# Patient Record
Sex: FEMALE | Race: OTHER | NOT HISPANIC OR LATINO | URBAN - METROPOLITAN AREA
[De-identification: names, ages, dates, MRNs, and addresses within clinical notes are randomized per-mention and may not be internally consistent; named-entity substitution may affect disease eponyms.]

---

## 2019-12-18 VITALS
OXYGEN SATURATION: 100 % | DIASTOLIC BLOOD PRESSURE: 75 MMHG | SYSTOLIC BLOOD PRESSURE: 149 MMHG | HEIGHT: 62 IN | HEART RATE: 75 BPM | TEMPERATURE: 98 F | WEIGHT: 141.1 LBS | RESPIRATION RATE: 16 BRPM

## 2019-12-18 RX ORDER — CHLORHEXIDINE GLUCONATE 213 G/1000ML
1 SOLUTION TOPICAL ONCE
Refills: 0 | Status: DISCONTINUED | OUTPATIENT
Start: 2019-12-19 | End: 2019-12-19

## 2019-12-18 NOTE — H&P ADULT - HISTORY OF PRESENT ILLNESS
38yo F with PMHx of HLD, HTN, FHx of MI (father in 70’s) who presented to her cardiologist Dr. Lim reporting occasional, brief, non-radiating left sided chest discomfort with exertion as well as at rest for the past several months. Pt also reported SPENCER upon walking 3 blocks or walking up 2 flights of stairs which is relieved with rest. Pt also reports fatigue, PND and orthopnea (states she sleeps sitting up). She also endorses occasional dizziness and palpitations. Pt states she has mild bilateral leg swelling and leg pain when standing for prolonged periods. Pt underwent a stress ECHO on 12/11/19 which was positive for myocardial ischemia by EKG revealing ST depression in leads II, III, aVF, V4-V6 at peak exercise, along with symptoms of chest tightness during recovery, normal LV wall motion response to exercise. TTE on 12/4/19 revealed EF 60-65%, mild diastolic dysfunction, borderline left atrial enlargement, trace MR and TR, trace pulmonary insufficiency, right ventricular systolic pressure 26mmHg. Pt denies syncope, melena, bright red blood per rectum, N/V.   In light of patients risk factors, CCS class IV symptoms  and abnormal stress ECHO patient is referred to [facility] for cardiac catheterization with possible intervention.   LMP???? 40yo Polish speaking F with PMHx of HLD, HTN, FHx of MI (father in 70’s) who presented to her cardiologist Dr. Lim reporting occasional, brief, non-radiating left sided chest discomfort with exertion as well as at rest for the past several months. Pt also reported SPENCER upon walking 3 blocks or walking up 2 flights of stairs which is relieved with rest. Pt also reports fatigue, PND and orthopnea (states she sleeps sitting up). She also endorses occasional dizziness and palpitations. Pt states she has mild bilateral leg swelling and leg pain when standing for prolonged periods. Pt underwent a stress ECHO on 12/11/19 which was positive for myocardial ischemia by EKG revealing ST depression in leads II, III, aVF, V4-V6 at peak exercise, along with symptoms of chest tightness during recovery, normal LV wall motion response to exercise. TTE on 12/4/19 revealed EF 60-65%, mild diastolic dysfunction, borderline left atrial enlargement, trace MR and TR, trace pulmonary insufficiency, right ventricular systolic pressure 26mmHg. Pt denies syncope, melena, bright red blood per rectum, N/V.   In light of patients risk factors, CCS class IV symptoms  and abnormal stress ECHO patient is referred to [facility] for cardiac catheterization with possible intervention.   LMP: 12/4/19

## 2019-12-18 NOTE — H&P ADULT - ASSESSMENT
38yo Sami speaking F with PMHx of HLD, HTN, FHx of MI (father in 70’s) who in light of risk factors, CCS class IV anginal symptoms and abnormal stress ECHO patient presents today for cardiac catheterization.     ASA II, Mallampati II    Hgb/HCT: 13.9/42.6. Pt denies bleeding, melena, bright red blood per rectum, hematuria. Pt reports compliance with daily Plavix 75mg PO x 1 for the past week. Pt has allergy to ASA, had facial swelling after one dose. Dr. Lim is aware and is recommending diagnostic cath today and ASA desensitization after cath. Pt was loaded with Plavix 150mg PO x 1 as per Dr. Lim.  NS@ 75ml/hr was given prior to procedure. EF 60-65% per TTE. Pt euvolemic on exam.     Pt is a candidate for moderate sedation: Yes    Risks & benefits of procedure and alternative therapy have been explained to the patient including but not limited to: allergic reaction, bleeding w/possible need for blood transfusion, infection, renal and vascular compromise, limb damage, arrhythmia, stroke, vessel dissection/perforation, Myocardial infarction, emergent CABG. Informed consent obtained and in chart. 38yo Pashto speaking F with PMHx of HLD, HTN, FHx of MI (father in 70’s) who in light of risk factors, CCS class IV anginal symptoms and abnormal stress ECHO patient presents today for cardiac catheterization.     ASA II, Mallampati II    Hgb/HCT: 13.9/42.6. Pt denies bleeding, melena, bright red blood per rectum, hematuria. Pt reports compliance with daily Plavix 75mg PO x 1 for the past week. Pt has allergy to ASA, had facial swelling after one dose. Dr. Lim is aware and is recommending diagnostic cath today and ASA desensitization after cath. Pt was loaded with Plavix 150mg PO x 1 as per Dr. Lim.  NS@ 75ml/hr was given prior to procedure. EF 60-65% per TTE. Pt euvolemic on exam.   K 3.6, KCl 40mEq PO x 1 was given prior to cath.     Pt is a candidate for moderate sedation: Yes    Risks & benefits of procedure and alternative therapy have been explained to the patient including but not limited to: allergic reaction, bleeding w/possible need for blood transfusion, infection, renal and vascular compromise, limb damage, arrhythmia, stroke, vessel dissection/perforation, Myocardial infarction, emergent CABG. Informed consent obtained and in chart. 40yo Occitan speaking F with PMHx of HLD, HTN, FHx of MI (father in 70’s) who in light of risk factors, CCS class IV anginal symptoms and abnormal stress ECHO patient presents today for cardiac catheterization.     ASA II, Mallampati II    Hgb/HCT: 13.9/42.6. Pt denies bleeding, melena, bright red blood per rectum, hematuria. Pt reports compliance with daily Plavix 75mg PO x 1 for the past week. Pt has allergy to ASA, had facial swelling after one dose. Dr. Lim is aware and is recommending diagnostic cath today and ASA desensitization after cath. Pt was loaded with Plavix 150mg PO x 1 as per Dr. Lim.  NS@ 75ml/hr was given prior to procedure. EF 60-65% per TTE. Pt euvolemic on exam.   K 3.6, KCl 40mEq PO x 1 was given prior to cath.   , HDL 96. Pt is currently not on statin, consider addition of statin post cath.     Pt is a candidate for moderate sedation: Yes    Risks & benefits of procedure and alternative therapy have been explained to the patient including but not limited to: allergic reaction, bleeding w/possible need for blood transfusion, infection, renal and vascular compromise, limb damage, arrhythmia, stroke, vessel dissection/perforation, Myocardial infarction, emergent CABG. Informed consent obtained and in chart.

## 2019-12-18 NOTE — H&P ADULT - NSHPLABSRESULTS_GEN_ALL_CORE
EKG: NSR@ 71bpm, TWI in III, V2-V4, flat T wave in II, V5-V6                          13.9   6.07  )-----------( 219      ( 19 Dec 2019 11:19 )             42.6       12-19    138  |  100  |  11  ----------------------------<  83  3.6   |  28  |  0.86    Ca    9.2      19 Dec 2019 11:59    TPro  6.7  /  Alb  4.1  /  TBili  0.3  /  DBili  x   /  AST  19  /  ALT  13  /  AlkPhos  46  12-19      PT/INR - ( 19 Dec 2019 11:19 )   PT: 12.2 sec;   INR: 1.08          PTT - ( 19 Dec 2019 11:19 )  PTT:36.8 sec    CARDIAC MARKERS ( 19 Dec 2019 11:59 )  x     / x     / 94 U/L / x     / 1.3 ng/mL

## 2019-12-19 ENCOUNTER — OUTPATIENT (OUTPATIENT)
Dept: OUTPATIENT SERVICES | Facility: HOSPITAL | Age: 39
LOS: 1 days | Discharge: NURSING, REHAB OR LONG-TERM CARE | End: 2019-12-19
Payer: MEDICAID

## 2019-12-19 DIAGNOSIS — Z98.890 OTHER SPECIFIED POSTPROCEDURAL STATES: Chronic | ICD-10-CM

## 2019-12-19 DIAGNOSIS — Z98.891 HISTORY OF UTERINE SCAR FROM PREVIOUS SURGERY: Chronic | ICD-10-CM

## 2019-12-19 LAB
ALBUMIN SERPL ELPH-MCNC: 4.1 G/DL — SIGNIFICANT CHANGE UP (ref 3.3–5)
ALP SERPL-CCNC: 46 U/L — SIGNIFICANT CHANGE UP (ref 40–120)
ALT FLD-CCNC: 13 U/L — SIGNIFICANT CHANGE UP (ref 10–45)
ANION GAP SERPL CALC-SCNC: 10 MMOL/L — SIGNIFICANT CHANGE UP (ref 5–17)
APTT BLD: 36.8 SEC — HIGH (ref 27.5–36.3)
AST SERPL-CCNC: 19 U/L — SIGNIFICANT CHANGE UP (ref 10–40)
BASOPHILS # BLD AUTO: 0.02 K/UL — SIGNIFICANT CHANGE UP (ref 0–0.2)
BASOPHILS NFR BLD AUTO: 0.3 % — SIGNIFICANT CHANGE UP (ref 0–2)
BILIRUB SERPL-MCNC: 0.3 MG/DL — SIGNIFICANT CHANGE UP (ref 0.2–1.2)
BUN SERPL-MCNC: 11 MG/DL — SIGNIFICANT CHANGE UP (ref 7–23)
CALCIUM SERPL-MCNC: 9.2 MG/DL — SIGNIFICANT CHANGE UP (ref 8.4–10.5)
CHLORIDE SERPL-SCNC: 100 MMOL/L — SIGNIFICANT CHANGE UP (ref 96–108)
CHOLEST SERPL-MCNC: 229 MG/DL — HIGH (ref 10–199)
CK MB CFR SERPL CALC: 1.3 NG/ML — SIGNIFICANT CHANGE UP (ref 0–6.7)
CK SERPL-CCNC: 94 U/L — SIGNIFICANT CHANGE UP (ref 25–170)
CO2 SERPL-SCNC: 28 MMOL/L — SIGNIFICANT CHANGE UP (ref 22–31)
CREAT SERPL-MCNC: 0.86 MG/DL — SIGNIFICANT CHANGE UP (ref 0.5–1.3)
EOSINOPHIL # BLD AUTO: 0.05 K/UL — SIGNIFICANT CHANGE UP (ref 0–0.5)
EOSINOPHIL NFR BLD AUTO: 0.8 % — SIGNIFICANT CHANGE UP (ref 0–6)
GLUCOSE SERPL-MCNC: 83 MG/DL — SIGNIFICANT CHANGE UP (ref 70–99)
HBA1C BLD-MCNC: 5.2 % — SIGNIFICANT CHANGE UP (ref 4–5.6)
HCG SERPL-ACNC: <0 MIU/ML — SIGNIFICANT CHANGE UP
HCT VFR BLD CALC: 42.6 % — SIGNIFICANT CHANGE UP (ref 34.5–45)
HDLC SERPL-MCNC: 96 MG/DL — SIGNIFICANT CHANGE UP
HGB BLD-MCNC: 13.9 G/DL — SIGNIFICANT CHANGE UP (ref 11.5–15.5)
IMM GRANULOCYTES NFR BLD AUTO: 0.2 % — SIGNIFICANT CHANGE UP (ref 0–1.5)
INR BLD: 1.08 — SIGNIFICANT CHANGE UP (ref 0.88–1.16)
LIPID PNL WITH DIRECT LDL SERPL: 121 MG/DL — HIGH
LYMPHOCYTES # BLD AUTO: 1.72 K/UL — SIGNIFICANT CHANGE UP (ref 1–3.3)
LYMPHOCYTES # BLD AUTO: 28.3 % — SIGNIFICANT CHANGE UP (ref 13–44)
MCHC RBC-ENTMCNC: 32.3 PG — SIGNIFICANT CHANGE UP (ref 27–34)
MCHC RBC-ENTMCNC: 32.6 GM/DL — SIGNIFICANT CHANGE UP (ref 32–36)
MCV RBC AUTO: 98.8 FL — SIGNIFICANT CHANGE UP (ref 80–100)
MONOCYTES # BLD AUTO: 0.45 K/UL — SIGNIFICANT CHANGE UP (ref 0–0.9)
MONOCYTES NFR BLD AUTO: 7.4 % — SIGNIFICANT CHANGE UP (ref 2–14)
NEUTROPHILS # BLD AUTO: 3.82 K/UL — SIGNIFICANT CHANGE UP (ref 1.8–7.4)
NEUTROPHILS NFR BLD AUTO: 63 % — SIGNIFICANT CHANGE UP (ref 43–77)
NRBC # BLD: 0 /100 WBCS — SIGNIFICANT CHANGE UP (ref 0–0)
PLATELET # BLD AUTO: 219 K/UL — SIGNIFICANT CHANGE UP (ref 150–400)
POTASSIUM SERPL-MCNC: 3.6 MMOL/L — SIGNIFICANT CHANGE UP (ref 3.5–5.3)
POTASSIUM SERPL-SCNC: 3.6 MMOL/L — SIGNIFICANT CHANGE UP (ref 3.5–5.3)
PROT SERPL-MCNC: 6.7 G/DL — SIGNIFICANT CHANGE UP (ref 6–8.3)
PROTHROM AB SERPL-ACNC: 12.2 SEC — SIGNIFICANT CHANGE UP (ref 10–12.9)
RBC # BLD: 4.31 M/UL — SIGNIFICANT CHANGE UP (ref 3.8–5.2)
RBC # FLD: 12.2 % — SIGNIFICANT CHANGE UP (ref 10.3–14.5)
SODIUM SERPL-SCNC: 138 MMOL/L — SIGNIFICANT CHANGE UP (ref 135–145)
TOTAL CHOLESTEROL/HDL RATIO MEASUREMENT: 2.4 RATIO — LOW (ref 3.3–7.1)
TRIGL SERPL-MCNC: 61 MG/DL — SIGNIFICANT CHANGE UP (ref 10–149)
WBC # BLD: 6.07 K/UL — SIGNIFICANT CHANGE UP (ref 3.8–10.5)
WBC # FLD AUTO: 6.07 K/UL — SIGNIFICANT CHANGE UP (ref 3.8–10.5)

## 2019-12-19 PROCEDURE — 82553 CREATINE MB FRACTION: CPT

## 2019-12-19 PROCEDURE — C1769: CPT

## 2019-12-19 PROCEDURE — 93010 ELECTROCARDIOGRAM REPORT: CPT

## 2019-12-19 PROCEDURE — 82550 ASSAY OF CK (CPK): CPT

## 2019-12-19 PROCEDURE — 85730 THROMBOPLASTIN TIME PARTIAL: CPT

## 2019-12-19 PROCEDURE — C1894: CPT

## 2019-12-19 PROCEDURE — C1887: CPT

## 2019-12-19 PROCEDURE — 93458 L HRT ARTERY/VENTRICLE ANGIO: CPT | Mod: 26

## 2019-12-19 PROCEDURE — 93571 IV DOP VEL&/PRESS C FLO 1ST: CPT | Mod: LD

## 2019-12-19 PROCEDURE — 93571 IV DOP VEL&/PRESS C FLO 1ST: CPT | Mod: 26,LD

## 2019-12-19 PROCEDURE — 85025 COMPLETE CBC W/AUTO DIFF WBC: CPT

## 2019-12-19 PROCEDURE — 80061 LIPID PANEL: CPT

## 2019-12-19 PROCEDURE — 84702 CHORIONIC GONADOTROPIN TEST: CPT

## 2019-12-19 PROCEDURE — 85610 PROTHROMBIN TIME: CPT

## 2019-12-19 PROCEDURE — 83036 HEMOGLOBIN GLYCOSYLATED A1C: CPT

## 2019-12-19 PROCEDURE — 93458 L HRT ARTERY/VENTRICLE ANGIO: CPT

## 2019-12-19 PROCEDURE — 80053 COMPREHEN METABOLIC PANEL: CPT

## 2019-12-19 PROCEDURE — 93005 ELECTROCARDIOGRAM TRACING: CPT

## 2019-12-19 RX ORDER — POTASSIUM CHLORIDE 20 MEQ
40 PACKET (EA) ORAL ONCE
Refills: 0 | Status: COMPLETED | OUTPATIENT
Start: 2019-12-19 | End: 2019-12-19

## 2019-12-19 RX ORDER — SODIUM CHLORIDE 9 MG/ML
500 INJECTION INTRAMUSCULAR; INTRAVENOUS; SUBCUTANEOUS
Refills: 0 | Status: DISCONTINUED | OUTPATIENT
Start: 2019-12-19 | End: 2019-12-19

## 2019-12-19 RX ORDER — METOPROLOL TARTRATE 50 MG
1 TABLET ORAL
Qty: 0 | Refills: 0 | DISCHARGE

## 2019-12-19 RX ORDER — RANOLAZINE 500 MG/1
1 TABLET, FILM COATED, EXTENDED RELEASE ORAL
Qty: 0 | Refills: 0 | DISCHARGE

## 2019-12-19 RX ORDER — CLOPIDOGREL BISULFATE 75 MG/1
1 TABLET, FILM COATED ORAL
Qty: 0 | Refills: 0 | DISCHARGE

## 2019-12-19 RX ORDER — ATORVASTATIN CALCIUM 80 MG/1
1 TABLET, FILM COATED ORAL
Qty: 30 | Refills: 1
Start: 2019-12-19 | End: 2020-02-16

## 2019-12-19 RX ORDER — NITROGLYCERIN 6.5 MG
1 CAPSULE, EXTENDED RELEASE ORAL
Qty: 0 | Refills: 0 | DISCHARGE

## 2019-12-19 RX ORDER — CLOPIDOGREL BISULFATE 75 MG/1
150 TABLET, FILM COATED ORAL ONCE
Refills: 0 | Status: COMPLETED | OUTPATIENT
Start: 2019-12-19 | End: 2019-12-19

## 2019-12-19 RX ADMIN — CLOPIDOGREL BISULFATE 150 MILLIGRAM(S): 75 TABLET, FILM COATED ORAL at 12:44

## 2019-12-19 RX ADMIN — SODIUM CHLORIDE 75 MILLILITER(S): 9 INJECTION INTRAMUSCULAR; INTRAVENOUS; SUBCUTANEOUS at 12:44

## 2019-12-19 RX ADMIN — Medication 40 MILLIEQUIVALENT(S): at 12:44

## 2019-12-19 NOTE — PROGRESS NOTE ADULT - SUBJECTIVE AND OBJECTIVE BOX
Interventional Cardiology Radial band Removal Note    Pt without complaints.  VSS.    RightRadial access site TR Hemoband in place, ___No__ hematoma, __No_bleed  Radial pulse:    Hemostasis achieved with manual release of hemoband.    ___No_  Vasovagal reaction.    Meds given: None    Right Radial access site  __no___ hematoma, __no____ bleed  Radial pulse: +2    A/P:  s/p Dx Coronary Angiogram  -	continue to monitor  -	-OOB as tolerated  -	Post Procedure Instructions given  -                   Call [phone] if any access site issues.

## 2019-12-19 NOTE — PROGRESS NOTE ADULT - SUBJECTIVE AND OBJECTIVE BOX
Interventional Cardiology [facility] Discharge Note    Patient without complaints. Ambulated and voided without difficulties    Afebrile, VSS    Ext:    Right   Radial :   no hematoma,   no  bleeding, dressing; C/D/I      Pulses:  +2  intact RAD to baseline     A/P:    38yo Georgian speaking F with PMHx of HLD, HTN, FHx of MI (father in 70’s) who in light of risk factors, CCS class IV anginal symptoms and abnormal stress ECHO patient presents today for cardiac catheterization    Pt is now s/p diagnostic cardiac catheterization 12/19/19 revealing: Lm patent, ostial LAD ~50% (IFR 0.95, negative) LCx patent, RCA dominant and patent.  As per Dr. Lim patient to remain on Plavix 75mg daily, and started on Atorvastatin 40mg daily.               1.	Stable for discharge as per attending Dr. Lim after bed rest, pt voids, wrist stable and 30 minutes of ambulation.  2.	Follow-up with PMD/Cardiologist Dr. Lim in 1-2 weeks  3.	Discharged forms signed and copies in chart

## 2020-08-03 NOTE — H&P ADULT - NEUROLOG[place] [address]
Phone:  [phone]                     ELECTROCARDIOGRAM REPORT      
_______________________________________________________________________________
 
Name:         LOUISA ESTRADA       Room:          [address]    ADM IN 
M.R.#:    [id number]     Account #:     [id number]  
Admission:    20    Attend Phys:   Brook Parada
Discharge:                Date of Birth: [birth year]  
Date of Service: 20 2244  Report #:      [id number]
        [id number]
_______________________________________________________________________________
THIS REPORT FOR:  //name//                      
 
                         [facility] ED
                                       
Test Date:    2020               Test Time:    22:44:55
Pat Name:     LOUISA ESTRADA          Department:   
Patient ID:   [id number]            Room:         [address]
Gender:       F                        Technician:   NELSON
:          1980               Requested By: Natalya Delarosa
Order Number: [id number]    Zaheer MD:   Farhad Burton
                                 Measurements
Intervals                              Axis          
Rate:         63                       P:            34
NC:           146                      QRS:          26
QRSD:         111                      T:            64
QT:           589                                    
QTc:          604                                    
                           Interpretive Statements
Sinus rhythm
Prolonged QT interval
No previous ECG available for comparison
Electronically Signed On 8-3-2020 15:21:40 CDT by Farhad Burton
[email or web address]
 
 
 
 
 
 
 
 
 
 
 
 
 
 
 
 
 
 
 
 
 
  <ELECTRONICALLY SIGNED>
                                           By: Farhad Burton MD, [facility]      
  20     1521
D: 20 2244   _____________________________________
T: 20 2244   Farhad Burton MD, [facility]        /EPI detailed exam

## 2021-05-06 PROBLEM — I10 ESSENTIAL (PRIMARY) HYPERTENSION: Chronic | Status: ACTIVE | Noted: 2019-12-18

## 2021-05-06 PROBLEM — E78.5 HYPERLIPIDEMIA, UNSPECIFIED: Chronic | Status: ACTIVE | Noted: 2019-12-18

## 2021-05-26 PROBLEM — Z00.00 ENCOUNTER FOR PREVENTIVE HEALTH EXAMINATION: Status: ACTIVE | Noted: 2021-05-26

## 2021-06-02 ENCOUNTER — OUTPATIENT (OUTPATIENT)
Dept: OUTPATIENT SERVICES | Facility: HOSPITAL | Age: 41
LOS: 1 days | End: 2021-06-02
Payer: MEDICAID

## 2021-06-02 ENCOUNTER — APPOINTMENT (OUTPATIENT)
Dept: CT IMAGING | Facility: HOSPITAL | Age: 41
End: 2021-06-02

## 2021-06-02 DIAGNOSIS — Z98.891 HISTORY OF UTERINE SCAR FROM PREVIOUS SURGERY: Chronic | ICD-10-CM

## 2021-06-02 DIAGNOSIS — Z98.890 OTHER SPECIFIED POSTPROCEDURAL STATES: Chronic | ICD-10-CM

## 2021-06-02 PROCEDURE — 75574 CT ANGIO HRT W/3D IMAGE: CPT

## 2021-06-02 PROCEDURE — 75574 CT ANGIO HRT W/3D IMAGE: CPT | Mod: 26
